# Patient Record
Sex: FEMALE | Race: WHITE | NOT HISPANIC OR LATINO
[De-identification: names, ages, dates, MRNs, and addresses within clinical notes are randomized per-mention and may not be internally consistent; named-entity substitution may affect disease eponyms.]

---

## 2021-04-06 ENCOUNTER — APPOINTMENT (OUTPATIENT)
Dept: PULMONOLOGY | Facility: HOSPITAL | Age: 21
End: 2021-04-06
Payer: COMMERCIAL

## 2021-04-06 VITALS — HEIGHT: 65 IN | WEIGHT: 130 LBS | BODY MASS INDEX: 21.66 KG/M2

## 2021-04-06 DIAGNOSIS — G47.19 OTHER HYPERSOMNIA: ICD-10-CM

## 2021-04-06 DIAGNOSIS — G47.21 CIRCADIAN RHYTHM SLEEP DISORDER, DELAYED SLEEP PHASE TYPE: ICD-10-CM

## 2021-04-06 PROBLEM — Z00.00 ENCOUNTER FOR PREVENTIVE HEALTH EXAMINATION: Status: ACTIVE | Noted: 2021-04-06

## 2021-04-06 PROCEDURE — 99204 OFFICE O/P NEW MOD 45 MIN: CPT | Mod: 95

## 2021-04-06 RX ORDER — BUPROPION HYDROCHLORIDE 100 MG/1
TABLET, FILM COATED ORAL
Refills: 0 | Status: ACTIVE | COMMUNITY

## 2021-04-06 RX ORDER — CITALOPRAM 10 MG/1
TABLET, FILM COATED ORAL
Refills: 0 | Status: ACTIVE | COMMUNITY

## 2021-04-06 NOTE — REASON FOR VISIT
[Home] : at home, [unfilled] , at the time of the visit. [Medical Office: ([facility])___] : at the medical office located in  [Verbal consent obtained from patient] : the patient, [unfilled] [Initial Evaluation] : an initial evaluation

## 2021-04-06 NOTE — HISTORY OF PRESENT ILLNESS
[FreeTextEntry1] : [facility]\par Dr. Marroquin\par 20 year old woman  with history of anxiety, depression is here in the sleep center to address insomnia, hypersomnia.  Patient has sleep difficulties since she was in middle school.  \par She tried melatonin (which gave her nightmares, vivid dreams), she didn’t like the way she felt.\par \par Anxiety and depression seems to be the main reasons why she developed sleep issues.\par \par She is feeling tired and exhausted during the day.\par \par Patient does not have any symptoms suggestive of sleep apnea.  There is minimal snoring, no witnessed apneas.   She is sleepy while driving.  \par Patient exercises 5 pm during the day.  She drinks 1 cup of caffeinated products, \par \par She eats dinner 8 pm at night and she smokes marijuana after dinner which relaxes her.  She watches ipad on the bed until she falls asleep.\par \par There is no alcohol use to explain the insomnia.\par \par She goes to bed late has difficulty falling asleep, On average tries to sleep 1-2 am, it takes 1-2 hrs to fall asleep, does not have problems staying asleep. She eventually wakes up 2 pm. She is still sleepy inspite of getting 10 hrs of sleep. Her sleep is non restorative.\par

## 2021-04-06 NOTE — ASSESSMENT
[FreeTextEntry1] : 20-year-old woman has significant sleep issues with a probable cause anxiety and depression.\par \par She has a delayed sleep phase syndrome, discussed about correcting the delayed sleep phase syndrome with low-dose melatonin at 11 PM and 10,000 Lux light around noontime.\par \par We also discussed about sleep hygiene habits including avoiding time on iPad after she takes melatonin.\par \par We will do PSG/MSLT to evaluate the etiologies for hypersomnia as patient is still tired and sleepy even after 10 hours of sleep. Will rule out narcolepsy or idiopathic hypersomnia.

## 2021-04-06 NOTE — REVIEW OF SYSTEMS
[EDS: ESS=____] : daytime somnolence: ESS=[unfilled] [Fatigue] : fatigue [Difficulty Initiating Sleep] : difficulty falling asleep [Negative] : Psychiatric

## 2023-11-09 ENCOUNTER — NON-APPOINTMENT (OUTPATIENT)
Age: 23
End: 2023-11-09